# Patient Record
Sex: MALE | Race: WHITE | NOT HISPANIC OR LATINO | URBAN - METROPOLITAN AREA
[De-identification: names, ages, dates, MRNs, and addresses within clinical notes are randomized per-mention and may not be internally consistent; named-entity substitution may affect disease eponyms.]

---

## 2017-09-19 ENCOUNTER — IMPORTED ENCOUNTER (OUTPATIENT)
Dept: URBAN - METROPOLITAN AREA CLINIC 31 | Facility: CLINIC | Age: 37
End: 2017-09-19

## 2017-09-19 PROCEDURE — 92310 CONTACT LENS FITTING OU: CPT

## 2017-09-19 PROCEDURE — 92014 COMPRE OPH EXAM EST PT 1/>: CPT

## 2017-09-19 PROCEDURE — V2521 CNTCT LENS HYDROPHILIC TORIC: HCPCS

## 2017-09-19 PROCEDURE — 92015 DETERMINE REFRACTIVE STATE: CPT

## 2017-09-19 NOTE — PATIENT DISCUSSION
Refractive error Annual Good ocular health documented. Discussed options of glasses contacts or refractive surgery. Discussed importance of annual eye exams. Continue present contact lens modality. Stop/wear and call if any redness pain or decrease in vision occur. Glasses change optional. Continue present contact lens modality. Stop/wear and call if any redness pain or decrease in vision occur.

## 2017-09-21 NOTE — PROCEDURE NOTE: CLINICAL
PROCEDURE NOTE: Eylea #2 OS. Diagnosis: Neovascular AMD with Active CNV. Prior to injection, risks/benefits/alternatives discussed including corneal abrasion, infection, loss of vision, hemorrhage, cataract, glaucoma, retinal tears or detachment. A written consent is on file, and the need for today’s injection was discussed and the patient is understanding and wishes to proceed. The entire vial of Eylea was drawn up into a syringe. The opened vial, remaining drug, and filtered needle were disposed of in a certified biohazard container. Betadine prep was performed. Topical anesthesia was induced with Alcaine. 4% lidocaine pledge. A lid speculum was used. A short 30g needle on a 1cc syringe was used with product that that had previously been prepared under sterile conditions. Injection site: 3-4 mm from the limbus. The used syringe/needle was transferred to a biohazard container. Lid speculum removed. Mask worn during procedure. Patient tolerated procedure well. Count fingers vision was verified. There were no complications. Patient was given the standard instruction sheet. Patient given office phone number/answering service number and advised to call immediately should there be loss of vision or pain, or should they have any other questions or concerns. Jama Correia

## 2017-09-21 NOTE — PATIENT DISCUSSION
Eylea #2 (treatment only) injection recommended today after discussion of benefits, risks, alternatives, and off-label use. The injection was administered without complication. Post-injection instructions were reviewed and understood by the patient.

## 2017-10-11 NOTE — PROCEDURE NOTE: CLINICAL
PROCEDURE NOTE: Eylea #5 OD. Diagnosis: Neovascular AMD with Active CNV. [facility]  Lot: -[id number] Exp: 8/1/18  Prior to injection, risks/benefits/alternatives discussed including corneal abrasion, infection, loss of vision, hemorrhage, cataract, glaucoma, retinal tears or detachment. A written consent is on file, and the need for today’s injection was discussed and the patient is understanding and wishes to proceed. The entire vial of Eylea was drawn up into a syringe. The opened vial, remaining drug, and filtered needle were disposed of in a certified biohazard container. Betadine prep was performed. Topical anesthesia was induced with Alcaine. 4% lidocaine pledge. A lid speculum was used. A short 30g needle on a 1cc syringe was used with product that that had previously been prepared under sterile conditions. Injection site: 3-4 mm from the limbus. The used syringe/needle was transferred to a biohazard container. Lid speculum removed. Mask worn during procedure. Patient tolerated procedure well. Count fingers vision was verified. There were no complications. Patient was given the standard instruction sheet. Patient given office phone number/answering service number and advised to call immediately should there be loss of vision or pain, or should they have any other questions or concerns. Enda Boss

## 2017-10-11 NOTE — PATIENT DISCUSSION
Hanglea #5 injection recommended today after discussion of benefits, risks, alternatives. The injection was administered without complication. Post-injection instructions were reviewed and understood by the patient.

## 2017-11-16 NOTE — PATIENT DISCUSSION
Pt edu stable. Due to high tendency to return rec Eylea # 3 today. Pt elects to proceed. Will treat every 6 weeks for now.

## 2017-11-16 NOTE — PATIENT DISCUSSION
Pt edu stable from last visit but still active. Recc continuing with treatments. Recc Hanglea # 6 in 2 weeks. Pt elects to proceed.

## 2017-11-16 NOTE — PATIENT DISCUSSION
Eylea # 3 injection recommended today after discussion of benefits, risks, alternatives. The injection was administered without complication. Post-injection instructions were reviewed and understood by the patient.

## 2017-11-16 NOTE — PROCEDURE NOTE: CLINICAL
PROCEDURE NOTE: Eylea #3 OS. Diagnosis: Neovascular AMD with Active CNV. Prep: Betadine Drops and Betadine Scrub. Prior to injection, risks/benefits/alternatives discussed including corneal abrasion, infection, loss of vision, hemorrhage, cataract, glaucoma, retinal tears or detachment. A written consent is on file, and the need for today’s injection was discussed and the patient is understanding and wishes to proceed. The entire vial of Eylea was drawn up into a syringe. The opened vial, remaining drug, and filtered needle were disposed of in a certified biohazard container. Betadine prep was performed. Topical anesthesia was induced with Alcaine. 4% lidocaine pledge. A lid speculum was used. A short 30g needle on a 1cc syringe was used with product that that had previously been prepared under sterile conditions. Injection site: 3-4 mm from the limbus. The used syringe/needle was transferred to a biohazard container. Lid speculum removed. Mask worn during procedure. Patient tolerated procedure well. Count fingers vision was verified. There were no complications. Patient was given the standard instruction sheet. Patient given office phone number/answering service number and advised to call immediately should there be loss of vision or pain, or should they have any other questions or concerns. Edna Boss

## 2017-11-30 NOTE — PATIENT DISCUSSION
Pt edu stable from last visit but still active. Recc continuing with treatments. Recc Eylea # 6. Pt elects to proceed.

## 2017-11-30 NOTE — PROCEDURE NOTE: CLINICAL
PROCEDURE NOTE: Eylea #6 OD. Diagnosis: Neovascular AMD with Active CNV. Prior to injection, risks/benefits/alternatives discussed including corneal abrasion, infection, loss of vision, hemorrhage, cataract, glaucoma, retinal tears or detachment. A written consent is on file, and the need for today’s injection was discussed and the patient is understanding and wishes to proceed. The entire vial of Eylea was drawn up into a syringe. The opened vial, remaining drug, and filtered needle were disposed of in a certified biohazard container. Betadine prep was performed. Topical anesthesia was induced with Alcaine. 4% lidocaine pledge. A lid speculum was used. A short 30g needle on a 1cc syringe was used with product that that had previously been prepared under sterile conditions. Injection site: 3-4 mm from the limbus. The used syringe/needle was transferred to a biohazard container. Lid speculum removed. Mask worn during procedure. Patient tolerated procedure well. Count fingers vision was verified. There were no complications. Patient was given the standard instruction sheet. Patient given office phone number/answering service number and advised to call immediately should there be loss of vision or pain, or should they have any other questions or concerns. Galen Tyson

## 2017-11-30 NOTE — PATIENT DISCUSSION
Kathya #6 injection recommended today after discussion of benefits, risks, alternatives, and off-label use. The injection was administered without complication. Post-injection instructions were reviewed and understood by the patient.

## 2017-11-30 NOTE — PATIENT DISCUSSION
Pt edu stable. Due to high tendency to return [facility] Juanita # 4 as naga. Pt elects to proceed. Will treat every 6 weeks for now.

## 2017-12-14 NOTE — PATIENT DISCUSSION
Pt edu stable from last visit but still active. Recc continuing with treatments. Pt will return in 2-3 weeks for tx only.

## 2017-12-14 NOTE — PATIENT DISCUSSION
Kathya #4 injection recommended today after discussion of benefits, risks, alternatives. The injection was administered without complication. Post-injection instructions were reviewed and understood by the patient.

## 2017-12-14 NOTE — PROCEDURE NOTE: CLINICAL
PROCEDURE NOTE: Eylea #4 OS. Diagnosis: Neovascular AMD with Active CNV. Prep: Betadine Drops and Betadine Scrub. Prior to injection, risks/benefits/alternatives discussed including corneal abrasion, infection, loss of vision, hemorrhage, cataract, glaucoma, retinal tears or detachment. A written consent is on file, and the need for today’s injection was discussed and the patient is understanding and wishes to proceed. The entire vial of Eylea was drawn up into a syringe. The opened vial, remaining drug, and filtered needle were disposed of in a certified biohazard container. Betadine prep was performed. Topical anesthesia was induced with Alcaine. 4% lidocaine pledge. A lid speculum was used. A short 30g needle on a 1cc syringe was used with product that that had previously been prepared under sterile conditions. Injection site: 3-4 mm from the limbus. The used syringe/needle was transferred to a biohazard container. Lid speculum removed. Mask worn during procedure. Patient tolerated procedure well. Count fingers vision was verified. There were no complications. Patient was given the standard instruction sheet. Patient given office phone number/answering service number and advised to call immediately should there be loss of vision or pain, or should they have any other questions or concerns. [facility]

## 2018-01-03 NOTE — PROCEDURE NOTE: CLINICAL
PROCEDURE NOTE: Eylea #7 OD. Diagnosis: Neovascular AMD with Active CNV. Prior to injection, risks/benefits/alternatives discussed including corneal abrasion, infection, loss of vision, hemorrhage, cataract, glaucoma, retinal tears or detachment. A written consent is on file, and the need for today’s injection was discussed and the patient is understanding and wishes to proceed. The entire vial of Eylea was drawn up into a syringe. The opened vial, remaining drug, and filtered needle were disposed of in a certified biohazard container. Betadine prep was performed. Topical anesthesia was induced with Alcaine. 4% lidocaine pledge. A lid speculum was used. A short 30g needle on a 1cc syringe was used with product that that had previously been prepared under sterile conditions. Injection site: 3-4 mm from the limbus. The used syringe/needle was transferred to a biohazard container. Lid speculum removed. Mask worn during procedure. Patient tolerated procedure well. Count fingers vision was verified. There were no complications. Patient was given the standard instruction sheet. Patient given office phone number/answering service number and advised to call immediately should there be loss of vision or pain, or should they have any other questions or concerns. Parul Hardwick

## 2018-01-03 NOTE — PATIENT DISCUSSION
Kathya # 7  injection recommended today after discussion of benefits, risks, alternatives. The injection was administered without complication. Post-injection instructions were reviewed and understood by the patient.

## 2018-01-30 NOTE — PROCEDURE NOTE: CLINICAL
PROCEDURE NOTE: Eylea #8 OS. Diagnosis: Neovascular AMD with Active CNV. Prior to injection, risks/benefits/alternatives discussed including corneal abrasion, infection, loss of vision, hemorrhage, cataract, glaucoma, retinal tears or detachment. A written consent is on file, and the need for today’s injection was discussed and the patient is understanding and wishes to proceed. The entire vial of Eylea was drawn up into a syringe. The opened vial, remaining drug, and filtered needle were disposed of in a certified biohazard container. Betadine prep was performed. Topical anesthesia was induced with Alcaine. 4% lidocaine pledge. A lid speculum was used. A short 30g needle on a 1cc syringe was used with product that that had previously been prepared under sterile conditions. Injection site: 3-4 mm from the limbus. The used syringe/needle was transferred to a biohazard container. Lid speculum removed. Mask worn during procedure. Patient tolerated procedure well. Count fingers vision was verified. There were no complications. Patient was given the standard instruction sheet. Patient given office phone number/answering service number and advised to call immediately should there be loss of vision or pain, or should they have any other questions or concerns. Aly Buchanan

## 2018-02-08 NOTE — PATIENT DISCUSSION
Juanita #8 injection recommended today after discussion of benefits, risks, alternatives. The patient elects to proceed. The injection was administered without complication. Post-injection instructions were reviewed and understood by the patient. TREATMENT ONLY TODAY.

## 2018-02-08 NOTE — PROCEDURE NOTE: CLINICAL
PROCEDURE NOTE: Eylea #8 OD. Diagnosis: Neovascular AMD with Active CNV. Prep: Betadine Drops and Betadine Scrub. Prior to injection, risks/benefits/alternatives discussed including corneal abrasion, infection, loss of vision, hemorrhage, cataract, glaucoma, retinal tears or detachment. A written consent is on file, and the need for today’s injection was discussed and the patient is understanding and wishes to proceed. The entire vial of Eylea was drawn up into a syringe. The opened vial, remaining drug, and filtered needle were disposed of in a certified biohazard container. Betadine prep was performed. Topical anesthesia was induced with Alcaine. 4% lidocaine pledge. A lid speculum was used. A short 30g needle on a 1cc syringe was used with product that that had previously been prepared under sterile conditions. Injection site: 3-4 mm from the limbus. The used syringe/needle was transferred to a biohazard container. Lid speculum removed. Mask worn during procedure. Patient tolerated procedure well. Count fingers vision was verified. There were no complications. Patient was given the standard instruction sheet. Patient given office phone number/answering service number and advised to call immediately should there be loss of vision or pain, or should they have any other questions or concerns. Savanah Bowens

## 2018-03-13 NOTE — PATIENT DISCUSSION
Kathya #9 injection recommended today after discussion of benefits, risks, alternatives. The patient elects to proceed. The injection was administered without complication. Post-injection instructions were reviewed and understood by the patient. TREATMENT ONLY TODAY.

## 2018-03-13 NOTE — PROCEDURE NOTE: CLINICAL
PROCEDURE NOTE: Eylea #9 OD. Diagnosis: Neovascular AMD with Active CNV. Prep: Betadine Drops and Betadine Scrub. Prior to injection, risks/benefits/alternatives discussed including corneal abrasion, infection, loss of vision, hemorrhage, cataract, glaucoma, retinal tears or detachment. A written consent is on file, and the need for today’s injection was discussed and the patient is understanding and wishes to proceed. The entire vial of Eylea was drawn up into a syringe. The opened vial, remaining drug, and filtered needle were disposed of in a certified biohazard container. Betadine prep was performed. Topical anesthesia was induced with Alcaine. 4% lidocaine pledge. A lid speculum was used. A short 30g needle on a 1cc syringe was used with product that that had previously been prepared under sterile conditions. Injection site: 3-4 mm from the limbus. The used syringe/needle was transferred to a biohazard container. Lid speculum removed. Mask worn during procedure. Patient tolerated procedure well. Count fingers vision was verified. There were no complications. Patient was given the standard instruction sheet. Patient given office phone number/answering service number and advised to call immediately should there be loss of vision or pain, or should they have any other questions or concerns. Carlotta Josue

## 2018-03-27 NOTE — PATIENT DISCUSSION
Kathya #6 injection recommended today after discussion of benefits, risks, alternatives. The injection was administered without complication. Post-injection instructions were reviewed and understood by the patient.

## 2018-03-27 NOTE — PROCEDURE NOTE: CLINICAL
PROCEDURE NOTE: Eylea #6 OS. Diagnosis: Neovascular AMD with Active CNV. Prior to injection, risks/benefits/alternatives discussed including corneal abrasion, infection, loss of vision, hemorrhage, cataract, glaucoma, retinal tears or detachment. A written consent is on file, and the need for today’s injection was discussed and the patient is understanding and wishes to proceed. The entire vial of Eylea was drawn up into a syringe. The opened vial, remaining drug, and filtered needle were disposed of in a certified biohazard container. Betadine prep was performed. Topical anesthesia was induced with Alcaine. 4% lidocaine pledge. A lid speculum was used. A short 30g needle on a 1cc syringe was used with product that that had previously been prepared under sterile conditions. Injection site: 3-4 mm from the limbus. The used syringe/needle was transferred to a biohazard container. Lid speculum removed. Mask worn during procedure. Patient tolerated procedure well. Count fingers vision was verified. There were no complications. Patient was given the standard instruction sheet. Patient given office phone number/answering service number and advised to call immediately should there be loss of vision or pain, or should they have any other questions or concerns. Belle Torrez

## 2018-04-24 NOTE — PROCEDURE NOTE: CLINICAL
PROCEDURE NOTE: Eylea #10 OD. Diagnosis: Neovascular AMD with Active CNV. Prior to injection, risks/benefits/alternatives discussed including corneal abrasion, infection, loss of vision, hemorrhage, cataract, glaucoma, retinal tears or detachment. A written consent is on file, and the need for today’s injection was discussed and the patient is understanding and wishes to proceed. The entire vial of Eylea was drawn up into a syringe. The opened vial, remaining drug, and filtered needle were disposed of in a certified biohazard container. Betadine prep was performed. Topical anesthesia was induced with Alcaine. 4% lidocaine pledge. A lid speculum was used. A short 30g needle on a 1cc syringe was used with product that that had previously been prepared under sterile conditions. Injection site: 3-4 mm from the limbus. The used syringe/needle was transferred to a biohazard container. Lid speculum removed. Mask worn during procedure. Patient tolerated procedure well. Count fingers vision was verified. There were no complications. Patient was given the standard instruction sheet. Patient given office phone number/answering service number and advised to call immediately should there be loss of vision or pain, or should they have any other questions or concerns. Crystal Lin

## 2018-04-24 NOTE — PATIENT DISCUSSION
Kathya #10 injection recommended today after discussion of benefits, risks, alternatives. The patient elects to proceed. The injection was administered without complication. Post-injection instructions were reviewed and understood by the patient.

## 2018-05-16 NOTE — PATIENT DISCUSSION
Juanita #7 today as previously discussed.  Injection given and tolerated well by patient.  S/S of endophthalmitis discussed with patient, patient to call with any changes or S/S of endophthalmitis. Follow up on 5/22/18 as scheduled.

## 2018-05-22 NOTE — PATIENT DISCUSSION
Juanita #11 injection recommended today after discussion of benefits, risks, alternatives. The patient elects to proceed. The injection was administered without complication. Post-injection instructions were reviewed and understood by the patient.

## 2018-05-22 NOTE — PROCEDURE NOTE: CLINICAL
PROCEDURE NOTE: Eylea #11 OD. Diagnosis: Neovascular AMD with Active CNV. Prior to injection, risks/benefits/alternatives discussed including corneal abrasion, infection, loss of vision, hemorrhage, cataract, glaucoma, retinal tears or detachment. A written consent is on file, and the need for today’s injection was discussed and the patient is understanding and wishes to proceed. The entire vial of Eylea was drawn up into a syringe. The opened vial, remaining drug, and filtered needle were disposed of in a certified biohazard container. Betadine prep was performed. Topical anesthesia was induced with Alcaine. 4% lidocaine pledge. A lid speculum was used. A short 30g needle on a 1cc syringe was used with product that that had previously been prepared under sterile conditions. Injection site: 3-4 mm from the limbus. The used syringe/needle was transferred to a biohazard container. Lid speculum removed. Mask worn during procedure. Patient tolerated procedure well. Count fingers vision was verified. There were no complications. Patient was given the standard instruction sheet. Patient given office phone number/answering service number and advised to call immediately should there be loss of vision or pain, or should they have any other questions or concerns. Patti Natarajan

## 2018-06-19 NOTE — PATIENT DISCUSSION
Juanita #12 injection recommended today after discussion of benefits, risks, alternatives. The patient elects to proceed. The injection was administered without complication. Post-injection instructions were reviewed and understood by the patient.

## 2018-06-19 NOTE — PATIENT DISCUSSION
Increased subretinal fluid seen on today's examination and diagnostic testing. Recommended continuing 4-5 week interval injections.

## 2018-06-19 NOTE — PATIENT DISCUSSION
06/19/2018 (RETINA)- OCT only at the next visit. POSSIBLE Eylea injection at that time. Recommended continuing injection intervals at 4-5 weeks OD and 8-9 weeks OS.

## 2018-06-19 NOTE — PROCEDURE NOTE: CLINICAL
PROCEDURE NOTE: Eylea #12 OD. Diagnosis: Neovascular AMD with Active CNV. Prep: Betadine Drops and Betadine Scrub. Prior to injection, risks/benefits/alternatives discussed including corneal abrasion, infection, loss of vision, hemorrhage, cataract, glaucoma, retinal tears or detachment. A written consent is on file, and the need for today’s injection was discussed and the patient is understanding and wishes to proceed. The entire vial of Eylea was drawn up into a syringe. The opened vial, remaining drug, and filtered needle were disposed of in a certified biohazard container. Betadine prep was performed. Topical anesthesia was induced with Alcaine. 4% lidocaine pledge. A lid speculum was used. A short 30g needle on a 1cc syringe was used with product that that had previously been prepared under sterile conditions. Injection site: 3-4 mm from the limbus. The used syringe/needle was transferred to a biohazard container. Lid speculum removed. Mask worn during procedure. Patient tolerated procedure well. Count fingers vision was verified. There were no complications. Patient was given the standard instruction sheet. Patient given office phone number/answering service number and advised to call immediately should there be loss of vision or pain, or should they have any other questions or concerns. [facility]

## 2018-07-25 NOTE — PROCEDURE NOTE: CLINICAL
PROCEDURE NOTE: Eylea #13 OD. Diagnosis: Neovascular AMD with Active CNV. Prep: Betadine Drops and Betadine Scrub. Prior to injection, risks/benefits/alternatives discussed including corneal abrasion, infection, loss of vision, hemorrhage, cataract, glaucoma, retinal tears or detachment. A written consent is on file, and the need for today’s injection was discussed and the patient is understanding and wishes to proceed. The entire vial of Eylea was drawn up into a syringe. The opened vial, remaining drug, and filtered needle were disposed of in a certified biohazard container. Betadine prep was performed. Topical anesthesia was induced with Alcaine. 4% lidocaine pledge. A lid speculum was used. A short 30g needle on a 1cc syringe was used with product that that had previously been prepared under sterile conditions. Injection site: 3-4 mm from the limbus. The used syringe/needle was transferred to a biohazard container. Lid speculum removed. Mask worn during procedure. Patient tolerated procedure well. Count fingers vision was verified. There were no complications. Patient was given the standard instruction sheet. Patient given office phone number/answering service number and advised to call immediately should there be loss of vision or pain, or should they have any other questions or concerns. Josette Land

## 2018-07-25 NOTE — PATIENT DISCUSSION
Eylea injection #13 recommended today after discussion of benefits, risks, and alternatives. he injection was administered without complication. Post-injection instructions were reviewed and understood by the patient.

## 2018-08-08 NOTE — PROCEDURE NOTE: CLINICAL
PROCEDURE NOTE: Eylea #8 OS. Diagnosis: Neovascular AMD with Active CNV. Prep: Betadine Drops and Betadine Scrub. Prior to injection, risks/benefits/alternatives discussed including corneal abrasion, infection, loss of vision, hemorrhage, cataract, glaucoma, retinal tears or detachment. A written consent is on file, and the need for today’s injection was discussed and the patient is understanding and wishes to proceed. The entire vial of Eylea was drawn up into a syringe. The opened vial, remaining drug, and filtered needle were disposed of in a certified biohazard container. Betadine prep was performed. Topical anesthesia was induced with Alcaine. 4% lidocaine pledge. A lid speculum was used. A short 30g needle on a 1cc syringe was used with product that that had previously been prepared under sterile conditions. Injection site: 3-4 mm from the limbus. The used syringe/needle was transferred to a biohazard container. Lid speculum removed. Mask worn during procedure. Patient tolerated procedure well. Count fingers vision was verified. There were no complications. Patient was given the standard instruction sheet. Patient given office phone number/answering service number and advised to call immediately should there be loss of vision or pain, or should they have any other questions or concerns. Jackie Bernabe

## 2018-09-05 NOTE — PATIENT DISCUSSION
Based on today's examination there was an increase in the SRF, we recommend Eylea injection.  Follow up in 4 weeks for repeat injection.

## 2018-09-05 NOTE — PATIENT DISCUSSION
Eylea injection  recommended today after discussion of benefits, risks, and alternatives. he injection was administered without complication. Post-injection instructions were reviewed and understood by the patient.

## 2018-09-05 NOTE — PROCEDURE NOTE: CLINICAL
PROCEDURE NOTE: Eylea OD. Diagnosis: Neovascular AMD with Active CNV. Prior to injection, risks/benefits/alternatives discussed including corneal abrasion, infection, loss of vision, hemorrhage, cataract, glaucoma, retinal tears or detachment. A written consent is on file, and the need for today’s injection was discussed and the patient is understanding and wishes to proceed. The entire vial of Eylea was drawn up into a syringe. The opened vial, remaining drug, and filtered needle were disposed of in a certified biohazard container. Betadine prep was performed. Topical anesthesia was induced with Alcaine. 4% lidocaine pledge. A lid speculum was used. A short 30g needle on a 1cc syringe was used with product that that had previously been prepared under sterile conditions. Injection site: 3-4 mm from the limbus. The used syringe/needle was transferred to a biohazard container. Lid speculum removed. Mask worn during procedure. Patient tolerated procedure well. Count fingers vision was verified. There were no complications. Patient was given the standard instruction sheet. Patient given office phone number/answering service number and advised to call immediately should there be loss of vision or pain, or should they have any other questions or concerns. David Burr

## 2018-09-05 NOTE — PATIENT DISCUSSION
No new srf/irf, stable, recommend continuing treatment q 3 months, goal is to maintain current level of vision.  Follow up in 1 month for  injection.

## 2018-10-09 NOTE — PATIENT DISCUSSION
Based on today's examination there was a decrease in the SRF, we recommend Eylea injection.  Follow up in 4 weeks for repeat injection.

## 2018-10-09 NOTE — PROCEDURE NOTE: CLINICAL
PROCEDURE NOTE: Eylea 2mg OD. Diagnosis: Neovascular AMD with Active CNV. Prior to injection, risks/benefits/alternatives discussed including corneal abrasion, infection, loss of vision, hemorrhage, cataract, glaucoma, retinal tears or detachment. A written consent is on file, and the need for today’s injection was discussed and the patient is understanding and wishes to proceed. The entire vial of Eylea was drawn up into a syringe. The opened vial, remaining drug, and filtered needle were disposed of in a certified biohazard container. Betadine prep was performed. Topical anesthesia was induced with Alcaine. 4% lidocaine pledge. A lid speculum was used. A short 30g needle on a 1cc syringe was used with product that that had previously been prepared under sterile conditions. Injection site: 3-4 mm from the limbus. The used syringe/needle was transferred to a biohazard container. Lid speculum removed. Mask worn during procedure. Patient tolerated procedure well. Count fingers vision was verified. There were no complications. Patient was given the standard instruction sheet. Patient given office phone number/answering service number and advised to call immediately should there be loss of vision or pain, or should they have any other questions or concerns. Martir Hull

## 2018-10-23 NOTE — PATIENT DISCUSSION
Patient is here for treatment only on the OS. We will continue the injection intervals every q4 weeks on the OD. Patient already has appt set for next examination.

## 2018-10-23 NOTE — PROCEDURE NOTE: CLINICAL
PROCEDURE NOTE: Eylea 2mg OS. Diagnosis: Neovascular AMD with Active CNV. Prior to injection, risks/benefits/alternatives discussed including corneal abrasion, infection, loss of vision, hemorrhage, cataract, glaucoma, retinal tears or detachment. A written consent is on file, and the need for today’s injection was discussed and the patient is understanding and wishes to proceed. The entire vial of Eylea was drawn up into a syringe. The opened vial, remaining drug, and filtered needle were disposed of in a certified biohazard container. Betadine prep was performed. Topical anesthesia was induced with Alcaine. 4% lidocaine pledge. A lid speculum was used. A short 30g needle on a 1cc syringe was used with product that that had previously been prepared under sterile conditions. Injection site: 3-4 mm from the limbus. The used syringe/needle was transferred to a biohazard container. Lid speculum removed. Mask worn during procedure. Patient tolerated procedure well. Count fingers vision was verified. There were no complications. Patient was given the standard instruction sheet. Patient given office phone number/answering service number and advised to call immediately should there be loss of vision or pain, or should they have any other questions or concerns. Latasha Oviedo

## 2018-11-06 NOTE — PROCEDURE NOTE: CLINICAL
PROCEDURE NOTE: Eylea 2mg OD. Diagnosis: Neovascular AMD with Active CNV. Prior to injection, risks/benefits/alternatives discussed including corneal abrasion, infection, loss of vision, hemorrhage, cataract, glaucoma, retinal tears or detachment. A written consent is on file, and the need for today’s injection was discussed and the patient is understanding and wishes to proceed. The entire vial of Eylea was drawn up into a syringe. The opened vial, remaining drug, and filtered needle were disposed of in a certified biohazard container. Betadine prep was performed. Topical anesthesia was induced with Alcaine. 4% lidocaine pledge. A lid speculum was used. A short 30g needle on a 1cc syringe was used with product that that had previously been prepared under sterile conditions. Injection site: 3-4 mm from the limbus. The used syringe/needle was transferred to a biohazard container. Lid speculum removed. Mask worn during procedure. Patient tolerated procedure well. Count fingers vision was verified. There were no complications. Patient was given the standard instruction sheet. Patient given office phone number/answering service number and advised to call immediately should there be loss of vision or pain, or should they have any other questions or concerns. Yee Renee

## 2018-11-06 NOTE — PATIENT DISCUSSION
Decision to treat was made based on today's clinical findings. Trace SRF, improved. Recommend continuing with injection today.  Follow up in 4-5 weeks for poss repeat injection.

## 2018-11-19 ENCOUNTER — IMPORTED ENCOUNTER (OUTPATIENT)
Dept: URBAN - METROPOLITAN AREA CLINIC 31 | Facility: CLINIC | Age: 38
End: 2018-11-19

## 2018-11-19 PROCEDURE — V2521 CNTCT LENS HYDROPHILIC TORIC: HCPCS

## 2018-11-19 PROCEDURE — 92310 CONTACT LENS FITTING OU: CPT

## 2018-11-19 PROCEDURE — 92015 DETERMINE REFRACTIVE STATE: CPT

## 2018-11-19 PROCEDURE — 92014 COMPRE OPH EXAM EST PT 1/>: CPT

## 2018-12-13 NOTE — PATIENT DISCUSSION
Decision to treat was made based on today's clinical findings. Increased SRF, worse. Recommend continuing with injection today.  Follow up in 4-5 weeks for poss repeat injection.

## 2018-12-13 NOTE — PROCEDURE NOTE: CLINICAL
PROCEDURE NOTE: Eylea 2mg OD. Diagnosis: Neovascular AMD with Active CNV. Prior to injection, risks/benefits/alternatives discussed including corneal abrasion, infection, loss of vision, hemorrhage, cataract, glaucoma, retinal tears or detachment. A written consent is on file, and the need for today’s injection was discussed and the patient is understanding and wishes to proceed. The entire vial of Eylea was drawn up into a syringe. The opened vial, remaining drug, and filtered needle were disposed of in a certified biohazard container. Betadine prep was performed. Topical anesthesia was induced with Alcaine. 4% lidocaine pledge. A lid speculum was used. A short 30g needle on a 1cc syringe was used with product that that had previously been prepared under sterile conditions. Injection site: 3-4 mm from the limbus. The used syringe/needle was transferred to a biohazard container. Lid speculum removed. Mask worn during procedure. Patient tolerated procedure well. Count fingers vision was verified. There were no complications. Patient was given the standard instruction sheet. Patient given office phone number/answering service number and advised to call immediately should there be loss of vision or pain, or should they have any other questions or concerns. Infante Eric

## 2019-01-03 NOTE — PATIENT DISCUSSION
Advised to call immediately if any worsening distortion or blurring is noted. Patient is asking that you add iron and vitamin d to the lab orders to be done prior to her 1/14/19 appt.

## 2019-01-15 NOTE — PROCEDURE NOTE: CLINICAL
PROCEDURE NOTE: Eylea 2mg OD. Diagnosis: Neovascular AMD with Active CNV. Prior to injection, risks/benefits/alternatives discussed including corneal abrasion, infection, loss of vision, hemorrhage, cataract, glaucoma, retinal tears or detachment. A written consent is on file, and the need for today’s injection was discussed and the patient is understanding and wishes to proceed. The entire vial of Eylea was drawn up into a syringe. The opened vial, remaining drug, and filtered needle were disposed of in a certified biohazard container. Betadine prep was performed. Topical anesthesia was induced with Alcaine. 4% lidocaine pledge. A lid speculum was used. A short 30g needle on a 1cc syringe was used with product that that had previously been prepared under sterile conditions. Injection site: 3-4 mm from the limbus. The used syringe/needle was transferred to a biohazard container. Lid speculum removed. Mask worn during procedure. Patient tolerated procedure well. Count fingers vision was verified. There were no complications. Patient was given the standard instruction sheet. Patient given office phone number/answering service number and advised to call immediately should there be loss of vision or pain, or should they have any other questions or concerns. Taz Mcclendon

## 2019-01-15 NOTE — PATIENT DISCUSSION
Decreased SRF seen on today's exam. Decision to treat was made based on today's clinical findings.  Follow up in 4-5 weeks for poss repeat injection.

## 2019-01-23 NOTE — PATIENT DISCUSSION
Patient here for treatment only as discussed at last visit.  Stable, no new SRF/IRF.  Recommend continuing with treatment q 3 months.  Goal is to maintain current level of vision.

## 2019-01-23 NOTE — PATIENT DISCUSSION
James Gumzan is a 13 month old female who was brought in for her  Well Child visit.     History was provided by caregiver  HPI:   Patient presents for:  Well Child    Concerns  none    Problem List  Patient Active Problem List:     Well child check     Enc See Wet AMD for IMP/PLN. bilaterally, no abnormal eye discharge is noted, conjunctiva are clear, extraocular motion is intact bilaterally; normal cover test, symmetric light reflex  Ears/Hearing:  tympanic membranes are normal bilaterally, hearing is grossly intact  Nose/Mouth/Thr reviewed with parent(s). Parental concerns and questions addressed. Feeding, development and activity discussed  Anticipatory guidance for age reviewed.   [facility] Developmental Handout provided    Follow up in 3 months    01/21/2017  Angella Borja MD

## 2019-01-23 NOTE — PROCEDURE NOTE: CLINICAL
PROCEDURE NOTE: Eylea 2mg OS. Diagnosis: Neovascular AMD with Active CNV. Prior to injection, risks/benefits/alternatives discussed including corneal abrasion, infection, loss of vision, hemorrhage, cataract, glaucoma, retinal tears or detachment. A written consent is on file, and the need for today’s injection was discussed and the patient is understanding and wishes to proceed. The entire vial of Eylea was drawn up into a syringe. The opened vial, remaining drug, and filtered needle were disposed of in a certified biohazard container. Betadine prep was performed. Topical anesthesia was induced with Alcaine. 4% lidocaine pledge. A lid speculum was used. A short 30g needle on a 1cc syringe was used with product that that had previously been prepared under sterile conditions. Injection site: 3-4 mm from the limbus. The used syringe/needle was transferred to a biohazard container. Lid speculum removed. Mask worn during procedure. Patient tolerated procedure well. Count fingers vision was verified. There were no complications. Patient was given the standard instruction sheet. Patient given office phone number/answering service number and advised to call immediately should there be loss of vision or pain, or should they have any other questions or concerns. Lizett Disla

## 2019-02-02 NOTE — PROCEDURE NOTE: CLINICAL
PROCEDURE NOTE: Eylea #7 OS. Diagnosis: Neovascular AMD with Active CNV. Prior to injection, risks/benefits/alternatives discussed including corneal abrasion, infection, loss of vision, hemorrhage, cataract, glaucoma, retinal tears or detachment. A written consent is on file, and the need for today’s injection was discussed and the patient is understanding and wishes to proceed. The entire vial of Eylea was drawn up into a syringe. The opened vial, remaining drug, and filtered needle were disposed of in a certified biohazard container. Betadine prep was performed. Topical anesthesia was induced with Alcaine. 4% lidocaine pledge. A lid speculum was used. A short 30g needle on a 1cc syringe was used with product that that had previously been prepared under sterile conditions. Injection site: 3-4 mm from the limbus. The used syringe/needle was transferred to a biohazard container. Lid speculum removed. Mask worn during procedure. Patient tolerated procedure well. Count fingers vision was verified. There were no complications. Patient was given the standard instruction sheet. Patient given office phone number/answering service number and advised to call immediately should there be loss of vision or pain, or should they have any other questions or concerns. Aly Buchanan
Patent

## 2019-02-12 NOTE — PATIENT DISCUSSION
Decision to treat was made based on today's clinical findings.  Decreased SRF, improved.  Recommend continuing with treatment today, return in 4-5 weeks for possible repeat injection at that time.

## 2019-02-12 NOTE — PROCEDURE NOTE: CLINICAL
PROCEDURE NOTE: Eylea 2mg OD. Diagnosis: Neovascular AMD with Active CNV. Prior to injection, risks/benefits/alternatives discussed including corneal abrasion, infection, loss of vision, hemorrhage, cataract, glaucoma, retinal tears or detachment. A written consent is on file, and the need for today’s injection was discussed and the patient is understanding and wishes to proceed. The entire vial of Eylea was drawn up into a syringe. The opened vial, remaining drug, and filtered needle were disposed of in a certified biohazard container. Betadine prep was performed. Topical anesthesia was induced with Alcaine. 4% lidocaine pledge. A lid speculum was used. A short 30g needle on a 1cc syringe was used with product that that had previously been prepared under sterile conditions. Injection site: 3-4 mm from the limbus. The used syringe/needle was transferred to a biohazard container. Lid speculum removed. Mask worn during procedure. Patient tolerated procedure well. Count fingers vision was verified. There were no complications. Patient was given the standard instruction sheet. Patient given office phone number/answering service number and advised to call immediately should there be loss of vision or pain, or should they have any other questions or concerns. Patti Natarajan

## 2019-03-12 NOTE — PROCEDURE NOTE: CLINICAL
PROCEDURE NOTE: Eylea 2mg OD. Diagnosis: Neovascular AMD with Active CNV. Prior to injection, risks/benefits/alternatives discussed including corneal abrasion, infection, loss of vision, hemorrhage, cataract, glaucoma, retinal tears or detachment. A written consent is on file, and the need for today’s injection was discussed and the patient is understanding and wishes to proceed. The entire vial of Eylea was drawn up into a syringe. The opened vial, remaining drug, and filtered needle were disposed of in a certified biohazard container. Betadine prep was performed. Topical anesthesia was induced with Alcaine. 4% lidocaine pledge. A lid speculum was used. A short 30g needle on a 1cc syringe was used with product that that had previously been prepared under sterile conditions. Injection site: 3-4 mm from the limbus. The used syringe/needle was transferred to a biohazard container. Lid speculum removed. Mask worn during procedure. Patient tolerated procedure well. Count fingers vision was verified. There were no complications. Patient was given the standard instruction sheet. Patient given office phone number/answering service number and advised to call immediately should there be loss of vision or pain, or should they have any other questions or concerns. Latasha Oviedo

## 2019-03-12 NOTE — PATIENT DISCUSSION
Stable, no new SRF/IRF.  Recommend continuing with treatment q 3 months.  Goal is to maintain current level of vision.

## 2019-03-12 NOTE — PATIENT DISCUSSION
Decision to treat was made based on today's clinical findings. No IRF/SRF, improved.  Recommend continuing with treatment today, return in 4-5 weeks for possible repeat injection at that time.

## 2019-04-09 NOTE — PATIENT DISCUSSION
Decision to treat was made based on today's clinical findings.  Recommend continuing with treatment today, return in 4-5 weeks for possible repeat injection at that time.

## 2019-04-09 NOTE — PROCEDURE NOTE: CLINICAL
PROCEDURE NOTE: Eylea 2mg OD. Diagnosis: Neovascular AMD with Active CNV. Prior to injection, risks/benefits/alternatives discussed including corneal abrasion, infection, loss of vision, hemorrhage, cataract, glaucoma, retinal tears or detachment. A written consent is on file, and the need for today’s injection was discussed and the patient is understanding and wishes to proceed. The entire vial of Eylea was drawn up into a syringe. The opened vial, remaining drug, and filtered needle were disposed of in a certified biohazard container. Betadine prep was performed. Topical anesthesia was induced with Alcaine. 4% lidocaine pledge. A lid speculum was used. A short 30g needle on a 1cc syringe was used with product that that had previously been prepared under sterile conditions. Injection site: 3-4 mm from the limbus. The used syringe/needle was transferred to a biohazard container. Lid speculum removed. Mask worn during procedure. Patient tolerated procedure well. Count fingers vision was verified. There were no complications. Patient was given the standard instruction sheet. Patient given office phone number/answering service number and advised to call immediately should there be loss of vision or pain, or should they have any other questions or concerns. Gustavo Arreguin

## 2019-04-09 NOTE — PATIENT DISCUSSION
No IRF/SRF present on today's exam. Recommend continuing with treatment q 3 months.   Goal is to maintain current level of vision.

## 2019-05-01 NOTE — PATIENT DISCUSSION
Increased PED, no new IRF/SRF present on today's exam/diagnostic testing. Recommend continuing with treatment q 3 months.   Goal is to maintain current level of vision.

## 2019-05-01 NOTE — PATIENT DISCUSSION
PED, no new SRF/IRF.  Will have patient return in 1-2 weeks for treatment only.  Recommend continuing treatment intervals q 4-5 weeks.  Goal of treatment is to maintain current level of vision.

## 2019-05-01 NOTE — PROCEDURE NOTE: CLINICAL
PROCEDURE NOTE: Eylea 2mg OS. Diagnosis: Neovascular AMD with Active CNV. Prior to injection, risks/benefits/alternatives discussed including corneal abrasion, infection, loss of vision, hemorrhage, cataract, glaucoma, retinal tears or detachment. A written consent is on file, and the need for today’s injection was discussed and the patient is understanding and wishes to proceed. The entire vial of Eylea was drawn up into a syringe. The opened vial, remaining drug, and filtered needle were disposed of in a certified biohazard container. Betadine prep was performed. Topical anesthesia was induced with Alcaine. 4% lidocaine pledge. A lid speculum was used. A short 30g needle on a 1cc syringe was used with product that that had previously been prepared under sterile conditions. Injection site: 3-4 mm from the limbus. The used syringe/needle was transferred to a biohazard container. Lid speculum removed. Mask worn during procedure. Patient tolerated procedure well. Count fingers vision was verified. There were no complications. Patient was given the standard instruction sheet. Patient given office phone number/answering service number and advised to call immediately should there be loss of vision or pain, or should they have any other questions or concerns. Torrie John

## 2019-05-15 NOTE — PROCEDURE NOTE: CLINICAL
PROCEDURE NOTE: Eylea 2mg OD. Diagnosis: Neovascular AMD with Active CNV. Prior to injection, risks/benefits/alternatives discussed including corneal abrasion, infection, loss of vision, hemorrhage, cataract, glaucoma, retinal tears or detachment. A written consent is on file, and the need for today’s injection was discussed and the patient is understanding and wishes to proceed. The entire vial of Eylea was drawn up into a syringe. The opened vial, remaining drug, and filtered needle were disposed of in a certified biohazard container. Betadine prep was performed. Topical anesthesia was induced with Alcaine. 4% lidocaine pledge. A lid speculum was used. A short 30g needle on a 1cc syringe was used with product that that had previously been prepared under sterile conditions. Injection site: 3-4 mm from the limbus. The used syringe/needle was transferred to a biohazard container. Lid speculum removed. Mask worn during procedure. Patient tolerated procedure well. Count fingers vision was verified. There were no complications. Patient was given the standard instruction sheet. Patient given office phone number/answering service number and advised to call immediately should there be loss of vision or pain, or should they have any other questions or concerns. Viral Torres

## 2019-06-12 NOTE — PROCEDURE NOTE: CLINICAL
PROCEDURE NOTE: Eylea 2mg OD. Diagnosis: Neovascular AMD with Active CNV. Prior to injection, risks/benefits/alternatives discussed including corneal abrasion, infection, loss of vision, hemorrhage, cataract, glaucoma, retinal tears or detachment. A written consent is on file, and the need for today’s injection was discussed and the patient is understanding and wishes to proceed. The entire vial of Eylea was drawn up into a syringe. The opened vial, remaining drug, and filtered needle were disposed of in a certified biohazard container. Betadine prep was performed. Topical anesthesia was induced with Alcaine. 4% lidocaine pledge. A lid speculum was used. A short 30g needle on a 1cc syringe was used with product that that had previously been prepared under sterile conditions. Injection site: 3-4 mm from the limbus. The used syringe/needle was transferred to a biohazard container. Lid speculum removed. Mask worn during procedure. Patient tolerated procedure well. Count fingers vision was verified. There were no complications. Patient was given the standard instruction sheet. Patient given office phone number/answering service number and advised to call immediately should there be loss of vision or pain, or should they have any other questions or concerns. Carlotta Josue

## 2019-06-12 NOTE — PATIENT DISCUSSION
Decision to treat was made based on today's clinical findings.  Increased SRF, PED, worse.  Continue with injection today, return in 4-5 weeks for possible repeat injection.

## 2019-06-12 NOTE — PATIENT DISCUSSION
Increased PED, no new IRF/SRF present on today's exam/diagnostic testing. Recommend continuing with treatment q 3 months.   Goal is to maintain current level of vision.  Monitor for now.

## 2019-08-07 NOTE — PATIENT DISCUSSION
Increased SRF/IRF, PED,worse,  present on today's exam/diagnostic testing. Will have Pt return in 1 week for Injection. Will treat every 4 weeks (alternating eyes).

## 2019-08-07 NOTE — PATIENT DISCUSSION
Decision to treat was made based on today's clinical findings.  Increased SRF/IRF, PED, worse.  Continue with injection today, return in 4 weeks for repeat injection (alternating eyes).

## 2019-08-09 NOTE — PROCEDURE NOTE: CLINICAL
PROCEDURE NOTE: Eylea 2mg Prior to injection, risks/benefits/alternatives discussed including corneal abrasion, infection, loss of vision, hemorrhage, cataract, glaucoma, retinal tears or detachment. A written consent is on file, and the need for today’s injection was discussed and the patient is understanding and wishes to proceed. The entire vial of Eylea was drawn up into a syringe. The opened vial, remaining drug, and filtered needle were disposed of in a certified biohazard container. Betadine prep was performed. Topical anesthesia was induced with Alcaine. 4% lidocaine pledge. A lid speculum was used. A short 30g needle on a 1cc syringe was used with product that that had previously been prepared under sterile conditions. Injection site: 3-4 mm from the limbus. The used syringe/needle was transferred to a biohazard container. Lid speculum removed. Mask worn during procedure. Patient tolerated procedure well. Count fingers vision was verified. There were no complications. Patient was given the standard instruction sheet. Patient given office phone number/answering service number and advised to call immediately should there be loss of vision or pain, or should they have any other questions or concerns. Josette Land

## 2019-08-09 NOTE — PATIENT DISCUSSION
Increased SRF/IRF, PED,worse,  present on today's exam/diagnostic testing. Will treat every 4 weeks (alternating eyes).

## 2019-09-06 NOTE — PATIENT DISCUSSION
Decreased PED/SRF Improved seen on today's exam, Continue with injection today, return in 4 weeks for repeat injection (alternating eyes).

## 2019-09-06 NOTE — PATIENT DISCUSSION
Decreased PED/SRF, improved seen on today's exam, continue intervals every 4 weeks alternating eyes.

## 2019-10-18 NOTE — PROCEDURE NOTE: CLINICAL
PROCEDURE NOTE: Eylea 2mg OS. Diagnosis: Neovascular AMD with Active CNV. Prior to injection, risks/benefits/alternatives discussed including corneal abrasion, infection, loss of vision, hemorrhage, cataract, glaucoma, retinal tears or detachment. A written consent is on file, and the need for today’s injection was discussed and the patient is understanding and wishes to proceed. The entire vial of Eylea was drawn up into a syringe. The opened vial, remaining drug, and filtered needle were disposed of in a certified biohazard container. Betadine prep was performed. Topical anesthesia was induced with Alcaine. 4% lidocaine pledge. A lid speculum was used. A short 30g needle on a 1cc syringe was used with product that that had previously been prepared under sterile conditions. Injection site: 3-4 mm from the limbus. The used syringe/needle was transferred to a biohazard container. Lid speculum removed. Mask worn during procedure. Patient tolerated procedure well. Count fingers vision was verified. There were no complications. Patient was given the standard instruction sheet. Patient given office phone number/answering service number and advised to call immediately should there be loss of vision or pain, or should they have any other questions or concerns. Yee Renee

## 2019-12-11 NOTE — PROCEDURE NOTE: CLINICAL
PROCEDURE NOTE: Eylea 2mg OS. Diagnosis: Neovascular AMD with Active CNV. Prior to injection, risks/benefits/alternatives discussed including corneal abrasion, infection, loss of vision, hemorrhage, cataract, glaucoma, retinal tears or detachment. A written consent is on file, and the need for today’s injection was discussed and the patient is understanding and wishes to proceed. The entire vial of Eylea was drawn up into a syringe. The opened vial, remaining drug, and filtered needle were disposed of in a certified biohazard container. Betadine prep was performed. Topical anesthesia was induced with Alcaine. 4% lidocaine pledge. A lid speculum was used. A short 30g needle on a 1cc syringe was used with product that that had previously been prepared under sterile conditions. Injection site: 3-4 mm from the limbus. The used syringe/needle was transferred to a biohazard container. Lid speculum removed. Mask worn during procedure. Patient tolerated procedure well. Count fingers vision was verified. There were no complications. Patient was given the standard instruction sheet. Patient given office phone number/answering service number and advised to call immediately should there be loss of vision or pain, or should they have any other questions or concerns. Viral Torres

## 2019-12-17 NOTE — PROCEDURE NOTE: CLINICAL
PROCEDURE NOTE: Eylea 2mg OD. Diagnosis: Neovascular AMD with Active CNV. Prep: Betadine Drops and Betadine Scrub. Prior to injection, risks/benefits/alternatives discussed including corneal abrasion, infection, loss of vision, hemorrhage, cataract, glaucoma, retinal tears or detachment. A written consent is on file, and the need for today’s injection was discussed and the patient is understanding and wishes to proceed. The entire vial of Eylea was drawn up into a syringe. The opened vial, remaining drug, and filtered needle were disposed of in a certified biohazard container. Betadine prep was performed. Topical anesthesia was induced with Alcaine. 4% lidocaine pledge. A lid speculum was used. A short 30g needle on a 1cc syringe was used with product that that had previously been prepared under sterile conditions. Injection site: 3-4 mm from the limbus. The used syringe/needle was transferred to a biohazard container. Lid speculum removed. Mask worn during procedure. Patient tolerated procedure well. Count fingers vision was verified. There were no complications. Patient was given the standard instruction sheet. Patient given office phone number/answering service number and advised to call immediately should there be loss of vision or pain, or should they have any other questions or concerns. Edna Boss

## 2019-12-18 ENCOUNTER — IMPORTED ENCOUNTER (OUTPATIENT)
Dept: URBAN - METROPOLITAN AREA CLINIC 31 | Facility: CLINIC | Age: 39
End: 2019-12-18

## 2019-12-18 PROCEDURE — 92014 COMPRE OPH EXAM EST PT 1/>: CPT

## 2019-12-18 PROCEDURE — 92015 DETERMINE REFRACTIVE STATE: CPT

## 2020-01-14 NOTE — PROCEDURE NOTE: CLINICAL
PROCEDURE NOTE: Eylea 2mg OS. Diagnosis: Neovascular AMD with Active CNV. Prep: Betadine Drops and Betadine Scrub. Prior to injection, risks/benefits/alternatives discussed including corneal abrasion, infection, loss of vision, hemorrhage, cataract, glaucoma, retinal tears or detachment. A written consent is on file, and the need for today’s injection was discussed and the patient is understanding and wishes to proceed. The entire vial of Eylea was drawn up into a syringe. The opened vial, remaining drug, and filtered needle were disposed of in a certified biohazard container. Betadine prep was performed. Topical anesthesia was induced with Alcaine. 4% lidocaine pledge. A lid speculum was used. A short 30g needle on a 1cc syringe was used with product that that had previously been prepared under sterile conditions. Injection site: 3-4 mm from the limbus. The used syringe/needle was transferred to a biohazard container. Lid speculum removed. Mask worn during procedure. Patient tolerated procedure well. Count fingers vision was verified. There were no complications. Patient was given the standard instruction sheet. Patient given office phone number/answering service number and advised to call immediately should there be loss of vision or pain, or should they have any other questions or concerns. Viral Torres

## 2020-02-19 NOTE — PATIENT DISCUSSION
Decision to treat was made based on today's clinical findings.  Decreased PED/SRF, improved, continue with injection today.

## 2020-02-19 NOTE — PATIENT DISCUSSION
Angel, Discussed the importance of blood pressure control in the prevention of ocular complications.

## 2020-02-19 NOTE — PROCEDURE NOTE: CLINICAL
PROCEDURE NOTE: Eylea Prefilled Syringe 2mg OD. Diagnosis: Neovascular AMD with Active CNV. Prior to injection, risks/benefits/alternatives discussed including corneal abrasion, infection, loss of vision, hemorrhage, cataract, glaucoma, retinal tears or detachment. A written consent is on file, and the need for today's injection was discussed and the patient is understanding and wishes to proceed. A 30G needle was placed on an Eylea 2mg/0.05ml Pre-filled Syringe. Betadine prep was performed. Topical anesthesia was induced with Alcaine. 4% lidocaine pledge. A lid speculum was used. An intravitreal injection of Eylea was given. Injection site: 3-4 mm from the limbus. The used syringe/needle was transferred to a biohazard container. Lid speculum removed. Mask worn during procedure. Patient tolerated procedure well. Count fingers vision was verified. There were no complications. Patient was given the standard instruction sheet. Edna Boss

## 2020-02-21 NOTE — PROCEDURE NOTE: CLINICAL

## 2020-03-13 NOTE — PATIENT DISCUSSION
Call immediately to report any decreased vision or visual distortion. 55 y/o M with unknown PMHx is BIB EMS for AMS s/p alcohol intoxication. Pt admits to heavy alcohol use today. He is currently vomiting actively at triage. Pt denies any other medical complaints including CP, SOB, fevers and chills. Unable to obtain remainder of HPI due to his current status.

## 2020-03-18 NOTE — PROCEDURE NOTE: CLINICAL
PROCEDURE NOTE: Eylea Prefilled Syringe 2mg OD. Diagnosis: Neovascular AMD with Active CNV. Prior to injection, risks/benefits/alternatives discussed including corneal abrasion, infection, loss of vision, hemorrhage, cataract, glaucoma, retinal tears or detachment. A written consent is on file, and the need for today's injection was discussed and the patient is understanding and wishes to proceed. A 30G needle was placed on an Eylea 2mg/0.05ml Pre-filled Syringe. Betadine prep was performed. Topical anesthesia was induced with Alcaine. 4% lidocaine pledge. A lid speculum was used. An intravitreal injection of Eylea was given. Injection site: 3-4 mm from the limbus. The used syringe/needle was transferred to a biohazard container. Lid speculum removed. Mask worn during procedure. Patient tolerated procedure well. Count fingers vision was verified. There were no complications. Patient was given the standard instruction sheet. Galen Tyson

## 2020-03-18 NOTE — PATIENT DISCUSSION
Decreased PED, improved. Eylea injection recommended today after discussion of benefits, risks, alternatives. The injection was administered without complication. Post-injection instructions were reviewed and understood by the patient.

## 2020-04-01 NOTE — PROCEDURE NOTE: CLINICAL
PROCEDURE NOTE: Eylea Prefilled Syringe 2mg OS. Diagnosis: Neovascular AMD with Active CNV. Prior to injection, risks/benefits/alternatives discussed including corneal abrasion, infection, loss of vision, hemorrhage, cataract, glaucoma, retinal tears or detachment. A written consent is on file, and the need for today's injection was discussed and the patient is understanding and wishes to proceed. A 30G needle was placed on an Eylea 2mg/0.05ml Pre-filled Syringe. Betadine prep was performed. Topical anesthesia was induced with Alcaine. 4% lidocaine pledge. A lid speculum was used. An intravitreal injection of Eylea was given. Injection site: 3-4 mm from the limbus. The used syringe/needle was transferred to a biohazard container. Lid speculum removed. Mask worn during procedure. Patient tolerated procedure well. Count fingers vision was verified. There were no complications. Patient was given the standard instruction sheet. Patti Natarajan

## 2020-04-16 NOTE — PATIENT DISCUSSION
Slightly increased PED, worse. Usrzula Loach injection recommended today after discussion of benefits, risks, alternatives. The injection was administered without complication. Post-injection instructions were reviewed and understood by the patient.

## 2020-04-16 NOTE — PROCEDURE NOTE: CLINICAL
PROCEDURE NOTE: Eylea Prefilled Syringe 2mg OD. Diagnosis: Neovascular AMD with Active CNV. Prior to injection, risks/benefits/alternatives discussed including corneal abrasion, infection, loss of vision, hemorrhage, cataract, glaucoma, retinal tears or detachment. A written consent is on file, and the need for today's injection was discussed and the patient is understanding and wishes to proceed. A 30G needle was placed on an Eylea 2mg/0.05ml Pre-filled Syringe. Betadine prep was performed. Topical anesthesia was induced with Alcaine. 4% lidocaine pledge. A lid speculum was used. An intravitreal injection of Eylea was given. Injection site: 3-4 mm from the limbus. The used syringe/needle was transferred to a biohazard container. Lid speculum removed. Mask worn during procedure. Patient tolerated procedure well. Count fingers vision was verified. There were no complications. Patient was given the standard instruction sheet. Parul Hardwick

## 2020-05-13 NOTE — PROCEDURE NOTE: CLINICAL

## 2020-05-27 NOTE — PATIENT DISCUSSION
Decreased PED, improved.  Patient here for treatment only as previously discussed at last visit.  Pt to return in 6 weeks for Focal Micro pulseLaser treatment.

## 2020-05-27 NOTE — PROCEDURE NOTE: CLINICAL
PROCEDURE NOTE: Eylea Prefilled Syringe 2mg OD. Diagnosis: Neovascular AMD with Active CNV. Prior to injection, risks/benefits/alternatives discussed including corneal abrasion, infection, loss of vision, hemorrhage, cataract, glaucoma, retinal tears or detachment. A written consent is on file, and the need for today's injection was discussed and the patient is understanding and wishes to proceed. A 30G needle was placed on an Eylea 2mg/0.05ml Pre-filled Syringe. Betadine prep was performed. Topical anesthesia was induced with Alcaine. 4% lidocaine pledge. A lid speculum was used. An intravitreal injection of Eylea was given. Injection site: 3-4 mm from the limbus. The used syringe/needle was transferred to a biohazard container. Lid speculum removed. Mask worn during procedure. Patient tolerated procedure well. Count fingers vision was verified. There were no complications. Patient was given the standard instruction sheet. Tiffani Loving PROCEDURE NOTE: Focal Laser, Retina OS. Diagnosis: Retinal Edema. Anesthesia: Topical. Prior to focal laser, risks/benefits/alternatives to laser discussed including loss of vision and patient wished to proceed. Spot size: 200 um. Power: 300 mW. Number of pulses: 80. Patient tolerated procedure well. There were no complications. Post procedure instructions given. Tiffani Loving

## 2020-06-25 NOTE — PATIENT DISCUSSION
Dr. Denver Underwood discussed with patient due to head trauma, instructed her to come back in 1 month for a dilated exam. pupil's are the same in each eye on exam , CT showed no pathology

## 2020-06-30 NOTE — PATIENT DISCUSSION
Decreased PED,Edema, improved.  Patient here for laser treatment as previously discussed at last visit.

## 2020-06-30 NOTE — PROCEDURE NOTE: CLINICAL
PROCEDURE NOTE: Eylea Prefilled Syringe 2mg OS. Diagnosis: Neovascular AMD with Active CNV. Prior to injection, risks/benefits/alternatives discussed including corneal abrasion, infection, loss of vision, hemorrhage, cataract, glaucoma, retinal tears or detachment. A written consent is on file, and the need for today's injection was discussed and the patient is understanding and wishes to proceed. A 30G needle was placed on an Eylea 2mg/0.05ml Pre-filled Syringe. Betadine prep was performed. Topical anesthesia was induced with Alcaine. 4% lidocaine pledge. A lid speculum was used. An intravitreal injection of Eylea was given. Injection site: 3-4 mm from the limbus. The used syringe/needle was transferred to a biohazard container. Lid speculum removed. Mask worn during procedure. Patient tolerated procedure well. Count fingers vision was verified. There were no complications. Patient was given the standard instruction sheet. Savanah Bowens PROCEDURE NOTE: Focal Laser, Retina OD. Diagnosis: Retinal Edema. Anesthesia: Topical. Prior to focal laser, risks/benefits/alternatives to laser discussed including loss of vision and patient wished to proceed. Spot size: 150 um. Power: 300 mW. Number of pulses: 50. Patient tolerated procedure well. There were no complications. Post procedure instructions given. Savanah Pollen

## 2020-07-28 NOTE — PROCEDURE NOTE: CLINICAL
PROCEDURE NOTE: Eylea Prefilled Syringe 2mg OD. Diagnosis: Neovascular AMD with Active CNV. Prior to injection, risks/benefits/alternatives discussed including corneal abrasion, infection, loss of vision, hemorrhage, cataract, glaucoma, retinal tears or detachment. A written consent is on file, and the need for today's injection was discussed and the patient is understanding and wishes to proceed. A 30G needle was placed on an Eylea 2mg/0.05ml Pre-filled Syringe. Betadine prep was performed. Topical anesthesia was induced with Alcaine. 4% lidocaine pledge. A lid speculum was used. An intravitreal injection of Eylea was given. Injection site: 3-4 mm from the limbus. The used syringe/needle was transferred to a biohazard container. Lid speculum removed. Mask worn during procedure. Patient tolerated procedure well. Count fingers vision was verified. There were no complications. Patient was given the standard instruction sheet. Isaac Jones

## 2020-08-05 NOTE — PROCEDURE NOTE: CLINICAL
PROCEDURE NOTE: Eylea Prefilled Syringe 2mg OS. Diagnosis: Neovascular AMD with Active CNV. Prior to injection, risks/benefits/alternatives discussed including corneal abrasion, infection, loss of vision, hemorrhage, cataract, glaucoma, retinal tears or detachment. A written consent is on file, and the need for today's injection was discussed and the patient is understanding and wishes to proceed. A 30G needle was placed on an Eylea 2mg/0.05ml Pre-filled Syringe. Betadine prep was performed. Topical anesthesia was induced with Alcaine. 4% lidocaine pledge. A lid speculum was used. An intravitreal injection of Eylea was given. Injection site: 3-4 mm from the limbus. The used syringe/needle was transferred to a biohazard container. Lid speculum removed. Mask worn during procedure. Patient tolerated procedure well. Count fingers vision was verified. There were no complications. Patient was given the standard instruction sheet. Savanah Pollen

## 2020-08-25 NOTE — PATIENT DISCUSSION
Decreased PED/SRF, improved.  Patient to continue interval q 6 weeks.  Pt to return treatment only in 3 weeks.

## 2020-08-25 NOTE — PROCEDURE NOTE: CLINICAL
PROCEDURE NOTE: Eylea Prefilled Syringe 2mg OD. Diagnosis: Neovascular AMD with Active CNV. Prior to injection, risks/benefits/alternatives discussed including corneal abrasion, infection, loss of vision, hemorrhage, cataract, glaucoma, retinal tears or detachment. A written consent is on file, and the need for today's injection was discussed and the patient is understanding and wishes to proceed. A 30G needle was placed on an Eylea 2mg/0.05ml Pre-filled Syringe. Betadine prep was performed. Topical anesthesia was induced with Alcaine. 4% lidocaine pledge. A lid speculum was used. An intravitreal injection of Eylea was given. Injection site: 3-4 mm from the limbus. The used syringe/needle was transferred to a biohazard container. Lid speculum removed. Mask worn during procedure. Patient tolerated procedure well. Count fingers vision was verified. There were no complications. Patient was given the standard instruction sheet. Maki Warner

## 2020-09-16 NOTE — PROCEDURE NOTE: CLINICAL

## 2020-09-22 NOTE — PROCEDURE NOTE: CLINICAL
PROCEDURE NOTE: Eylea Prefilled Syringe 2mg OD. Diagnosis: Neovascular AMD with Active CNV. Prior to injection, risks/benefits/alternatives discussed including corneal abrasion, infection, loss of vision, hemorrhage, cataract, glaucoma, retinal tears or detachment. A written consent is on file, and the need for today's injection was discussed and the patient is understanding and wishes to proceed. A 30G needle was placed on an Eylea 2mg/0.05ml Pre-filled Syringe. Betadine prep was performed. Topical anesthesia was induced with Alcaine. 4% lidocaine pledge. A lid speculum was used. An intravitreal injection of Eylea was given. Injection site: 3-4 mm from the limbus. The used syringe/needle was transferred to a biohazard container. Lid speculum removed. Mask worn during procedure. Patient tolerated procedure well. Count fingers vision was verified. There were no complications. Patient was given the standard instruction sheet. [facility]

## 2020-10-20 NOTE — PROCEDURE NOTE: CLINICAL
PROCEDURE NOTE: Eylea Prefilled Syringe 2mg OD. Diagnosis: Neovascular AMD with Active CNV. Prior to injection, risks/benefits/alternatives discussed including corneal abrasion, infection, loss of vision, hemorrhage, cataract, glaucoma, retinal tears or detachment. A written consent is on file, and the need for today's injection was discussed and the patient is understanding and wishes to proceed. A 30G needle was placed on an Eylea 2mg/0.05ml Pre-filled Syringe. Betadine prep was performed. Topical anesthesia was induced with Alcaine. 4% lidocaine pledge. A lid speculum was used. An intravitreal injection of Eylea was given. Injection site: 3-4 mm from the limbus. The used syringe/needle was transferred to a biohazard container. Lid speculum removed. Mask worn during procedure. Patient tolerated procedure well. Count fingers vision was verified. There were no complications. Patient was given the standard instruction sheet. Sujata Corley

## 2020-10-28 NOTE — PROCEDURE NOTE: CLINICAL

## 2020-11-17 NOTE — PROCEDURE NOTE: CLINICAL

## 2020-12-09 NOTE — PROCEDURE NOTE: CLINICAL

## 2020-12-15 NOTE — PROCEDURE NOTE: CLINICAL

## 2021-01-12 NOTE — PROCEDURE NOTE: CLINICAL

## 2021-01-20 NOTE — PROCEDURE NOTE: CLINICAL

## 2021-03-03 NOTE — PROCEDURE NOTE: CLINICAL

## 2021-03-09 NOTE — PROCEDURE NOTE: CLINICAL
PROCEDURE NOTE: Eylea Prefilled Syringe 2mg OD. Diagnosis: Neovascular AMD with Active CNV. Prep: Betadine Drops and Betadine Scrub.

## 2021-03-31 NOTE — PROCEDURE NOTE: CLINICAL

## 2021-04-06 NOTE — PROCEDURE NOTE: CLINICAL

## 2021-04-28 NOTE — PROCEDURE NOTE: CLINICAL

## 2021-05-10 ENCOUNTER — IMPORTED ENCOUNTER (OUTPATIENT)
Dept: URBAN - METROPOLITAN AREA CLINIC 31 | Facility: CLINIC | Age: 41
End: 2021-05-10

## 2021-05-10 PROCEDURE — 92015 DETERMINE REFRACTIVE STATE: CPT

## 2021-05-10 PROCEDURE — 92014 COMPRE OPH EXAM EST PT 1/>: CPT

## 2021-05-26 NOTE — PATIENT DISCUSSION
Symptoms of retinal detachment and endophthalmitis following intravitreal injection discussed; patient advised to call immediately if symptoms ensue. Herman

## 2021-05-26 NOTE — PROCEDURE NOTE: CLINICAL

## 2021-06-01 NOTE — PROCEDURE NOTE: CLINICAL
PROCEDURE NOTE: Eylea Prefilled Syringe 2mg Prior to injection, risks/benefits/alternatives discussed including corneal abrasion, infection, loss of vision, hemorrhage, cataract, glaucoma, retinal tears or detachment. A written consent is on file, and the need for today's injection was discussed and the patient is understanding and wishes to proceed. A 30G needle was placed on an Eylea 2mg/0.05ml Pre-filled Syringe. Betadine prep was performed. Topical anesthesia was induced with Alcaine. 4% lidocaine pledge. A lid speculum was used. An intravitreal injection of Eylea was given. Injection site: 3-4 mm from the limbus. The used syringe/needle was transferred to a biohazard container. Lid speculum removed. Mask worn during procedure. Patient tolerated procedure well. Count fingers vision was verified. There were no complications. Patient was given the standard instruction sheet. Nelson Castillo

## 2021-06-23 NOTE — PROCEDURE NOTE: CLINICAL

## 2021-07-07 NOTE — PATIENT DISCUSSION
Slightly Increased PED, No Fluid, worse, No treatment today,   Continue treatment interval Q 6 weeks.  Patient to return as scheduled.

## 2021-08-09 NOTE — PATIENT DISCUSSION
Avtar Cooley (:  1967) is a 62 y.o. female,     Chief Complaint   Patient presents with    Heart Murmur     OV for follow up [facility]. Pt right wrist healed well. Pt has not been taking ASA, Lipitor, or any Diabetes medicine. States the medicine was not agreeing with her. Pt denies any chest pain,SOB,dizziness,swelling to ankles, or palpitations.  Hypertension    Hyperlipidemia    Follow-Up from Hospital     Patient is here for follow up for multiple coronary artery disease risk factors and anxiety panic disorder. She had a cardiac cath done by Dr. Jean-Claude Aceves on 2021 which reported normal coronary arteries. She is here for follow-up with much of questions. She has been to her psychologist this morning for anxiety disorder. She says she stopped taking aspirin and atorvastatin as well as some of the diabetes medicines which are not agreeing with her. She stopped atorvastatin because of severe leg pains. They resolved after she stopped medications last week. She is a former smoker. Allergies   Allergen Reactions    Latex      \"Redness\"    Atorvastatin      Severe myalgias    Codeine      \"I Don't Remember The Reaction , I Was A Child\"    Darvon [Propoxyphene]      \"I Don't Remember The Reaction, I Was A Child\"    Pcn [Penicillins]      \"I Don't Remember The Reaction, I Was A Child\"     Prior to Admission medications    Medication Sig Start Date End Date Taking? Authorizing Provider   montelukast (SINGULAIR) 10 MG tablet TAKE 1 TABLET BY MOUTH DAILY 21  Yes Gilford Lime, MD   nitroGLYCERIN (NITROSTAT) 0.4 MG SL tablet up to max of 3 total doses.  If no relief after 1 dose, call 911. 21  Yes Mary Smith MD   letrozole [facility]) 2.5 MG tablet TAKE ONE TABLET BY MOUTH EVERY DAY 21  Yes Jake King MD   budesonide-formoterol (SYMBICORT) 160-4.5 MCG/ACT AERO Inhale 2 puffs into the lungs 2 times daily 21  Yes Gilford Lime, MD   pantoprazole (PROTONIX) 40 MG Monitor. tablet  4/12/21  Yes Historical Provider, MD   loperamide (RA ANTI-DIARRHEAL) 2 MG capsule Take 1 capsule by mouth 4 times daily as needed for Diarrhea 3/29/21  Yes Mariela Hargrove MD   fexofenadine TY [facility] ALLERGY) 60 MG tablet Take 60 mg by mouth every 12 hours as needed    Yes Historical Provider, MD   FLUoxetine (PROZAC) 10 MG capsule Take 1 capsule by mouth daily 4/22/20  Yes Historical Provider, MD   hydroCHLOROthiazide (MICROZIDE) 12.5 MG capsule Take 1 capsule by mouth daily 5/1/20  Yes Historical Provider, MD   hydrOXYzine (VISTARIL) 25 MG capsule Take 1 capsule by mouth daily 4/23/20  Yes Historical Provider, MD   losartan (COZAAR) 100 MG tablet Take 1 tablet by mouth daily 1/22/20  Yes Historical Provider, MD   SITagliptin (JANUVIA) 100 MG tablet Take 100 mg by mouth daily  Patient not taking: Reported on 8/9/2021    Historical Provider, MD   aspirin 81 MG chewable tablet Take 1 tablet by mouth daily  Patient not taking: Reported on 8/9/2021 6/6/21   Aime Hadrwick MD   metFORMIN (GLUCOPHAGE) 500 MG tablet Take 500 mg by mouth daily (with breakfast)   Patient not taking: Reported on 8/9/2021    Historical Provider, MD     Past Medical History:   Diagnosis Date    Abnormal ECG 01/29/2019    Acid reflux     Anxiety     Arthritis     \"Back, Legs, Knees And Feet\"    Back problem     \"Three Discs Smashed Together Lower Back\"    Bipolar disorder ([address]     Chronic back pain     Chronic cough 01/31/2020    Class 3 severe obesity with body mass index (BMI) of 40.0 to 44.9 in adult ([address]     COPD, moderate ([address] 01/31/2020    Curvature of spine     \"Wore A Brace For Three Years\"    Depression     Diabetes mellitus ([address] Dx 2010    Diverticulitis     Esophageal dilatation 04/12/2017    Family history of sudden death 01/29/2019    Mother at age 58    Full dentures     H/O 24 hour EKG monitoring 01/29/2019    Conclusion: Normal study suggesting normal sinus rhythm with no clinically significant arrhythmias.  H/O cardiovascular stress test 02/12/2019    EF65% Normal    H/O echocardiogram 06/02/2021    Normal left ventricle structure and systolic function with an ejection 55-60% No significant valvular disease noted.  WBEJZLBQ(680.2)     \"Occ\"    Heart murmur     No Cardiologist At This Time    History of nuclear stress test 02/12/2019    EF 65%. Normal study.  [place] (hard of hearing)     Bilateral Hearing Aides    Hydronephrosis     dx with hydronephrosis with admission 6/2018- right ureteral stone/\"had kidney stone stone about 2-3 yrs ago and passed it\"    Hyperlipidemia     Hypertension     Macular degeneration     Right Eye    Mild persistent asthma without complication 45/52/1112    MRSA (methicillin resistant Staphylococcus aureus)     After PRICILLA In 2006 (Abdomen)    Near syncope 01/29/2019 1/28/19 in ED [place]    Panic attacks     PONV (postoperative nausea and vomiting)     denies any motion sickness    Right Breast Cancer Dx 8-15    Right Breast Lumpectomy, Had Chemotherapy And Radiation Treatments    S/P exploratory laparotomy 10/18/2020    explore lap for small bowel perf repair and abdominal wall hernia repair    Seizure ([address]     \"when I was a young kid had seizure and was on medication- not sure how old when last seizure but as a kid\"    Sepsis ([address]     per old chart - admitted 6/2018 with sepsis secondary to wound infection    Shortness of breath 02/04/2020    Sleep apnea     Has CPAP- last sleep study 2-3 yrs ago\"    Thyroid disease     \"Thyroid Problems As A Child\"    Type 2 diabetes mellitus without complication, without long-term current use of insulin ([address] 7/15/2021    Urinary incontinence     WD-Soft tissue radionecrosis 10/28/2016    WD-Wound of right breast 10/28/2016    Wears glasses       Vitals:    08/09/21 1353   BP: 120/68   Pulse: 90   Weight: 271 lb (122.9 kg)   Height: 5' 7\" (1.702 m)      Body mass index is 42.44 kg/m².   Wt Readings from Last 3 Encounters:   08/09/21 271 lb (122.9 kg)   08/05/21 274 lb (124.3 kg)   07/15/21 272 lb (123.4 kg)     Pertinent records reviewed and discussed with patient and results are as follow:  Cardiac cath on August 5, 2021 reported normal coronaries, normal LVEDP     Right radial access site has healed well. Lab Results   Component Value Date    WBC 7.7 08/02/2021    HGB 12.7 08/02/2021    HCT 39.7 08/02/2021     08/02/2021     Lab Results   Component Value Date    CHOL 200 (H) 10/01/2012    CHOLFAST 204 (H) 06/05/2021    TRIG 127 10/01/2012    TRIGLYCFAST 159 (H) 06/05/2021    HDL 68 06/05/2021    LDLDIRECT 132 (H) 06/05/2021     Lab Results   Component Value Date    BUN 10 08/02/2021    CREATININE 0.7 08/02/2021     08/02/2021    K 4.0 08/02/2021     Lab Results   Component Value Date    INR 1.27 06/11/2018     Lab Results   Component Value Date    LABA1C 7.1 (H) 06/05/2021     ASSESSMENT/PLAN:    1. Type 2 diabetes mellitus without complication, without long-term current use of insulin ([address]  2. Family history of sudden death  1. Dyspnea on exertion      Ok to stop Aspirin and Atorvastatin. Continue diabetes therapy as per PCP. Primary prevention is the goal by aggressive risk modification, healthy and therapeutic life style changes for cardiovascular risk reduction. Various goals are discussed and questions answered. Follow-up with PCP and see me as,  needed. On this date 8/9/2021 I have spent 15 minutes reviewing previous notes, test results and face to face with the patient discussing the diagnosis and importance of compliance with the treatment plan as well as documenting on the day of the visit. An electronic signature was used to authenticate this note.     --Neel Elias MD

## 2021-08-24 NOTE — PROCEDURE NOTE: CLINICAL

## 2021-09-21 NOTE — PROCEDURE NOTE: CLINICAL

## 2021-09-29 NOTE — PROCEDURE NOTE: CLINICAL

## 2021-10-27 NOTE — PROCEDURE NOTE: CLINICAL

## 2021-11-03 NOTE — PATIENT DISCUSSION
OTC Reading glasses recommended. Patient with one or more new problems requiring additional work-up/treatment.

## 2021-11-16 NOTE — PROCEDURE NOTE: CLINICAL

## 2021-12-08 NOTE — PROCEDURE NOTE: CLINICAL

## 2022-01-19 NOTE — PROCEDURE NOTE: CLINICAL

## 2022-03-08 NOTE — PROCEDURE NOTE: CLINICAL

## 2022-03-16 NOTE — PROCEDURE NOTE: CLINICAL

## 2022-03-24 NOTE — PATIENT DISCUSSION
Impression: Age-related nuclear cataract, left eye: H25.12. Plan: Patient advised there is a cataract, but that visual functioning is good, and cataract surgery is not required at this time. Further advised there is no specific urgency for cataract surgery. They were also advised that having cataract surgery does not mean they will not need to use glasses or contact lenses. We will continue to monitor and they are advised to call or return if any new symptoms. Recommended observation.

## 2022-04-02 ASSESSMENT — VISUAL ACUITY
OS_CC: J1+14"
OS_SC: 20/20
OD_SC: 20/20
OS_CC: J118''
OD_CC: J1+14''
OS_CC: J118''
OD_SC: 20/20
OD_CC: J1+18''
OD_CC: J114"
OD_CC: J118''
OS_CC: J1+14''
OS_SC: 20/20
OS_CC: 20/400
OD_CC: 20/400
OD_SC: 20/20
OS_SC: 20/20
OS_SC: 20/25-2
OD_SC: 20/20-3

## 2022-04-02 ASSESSMENT — TONOMETRY
OD_IOP_MMHG: 13
OS_IOP_MMHG: 12
OS_IOP_MMHG: 13
OS_IOP_MMHG: 12
OS_IOP_MMHG: 12
OD_IOP_MMHG: 12
OD_IOP_MMHG: 12
OD_IOP_MMHG: 11

## 2022-04-13 NOTE — PATIENT DISCUSSION
An examination that was significantly and separately identifiable from the procedure performed today was also completed for this Sebaceous Cyst, Monitor.

## 2022-04-13 NOTE — PATIENT DISCUSSION
Not active, Discussed with the patient the importance of good control of their blood sugar, blood pressure, cholesterol, diet, exercise, weight, and medication usage under the guidance of their diabetic doctor to prevent/halt diabetic retinopathy.

## 2022-04-22 NOTE — PROCEDURE NOTE: CLINICAL

## 2022-04-22 NOTE — PATIENT DISCUSSION
Based on today's exam, diagnostic studies, and/or review of records, determination was made for observation and follow-up.

## 2022-05-25 NOTE — PATIENT DISCUSSION
Not Active, No DME, Discussed the importance of blood sugar control in the prevention of ocular complications. Additional Area 4 Location: Depressor Labii Inferioris (for an asymmetrical lower smile)

## 2022-05-25 NOTE — PROCEDURE NOTE: CLINICAL

## 2022-06-08 NOTE — PATIENT DISCUSSION
Not Active, No DME, Discussed the importance of blood sugar control in the prevention of ocular complications.

## 2022-06-08 NOTE — PROCEDURE NOTE: CLINICAL

## 2022-06-08 NOTE — PATIENT DISCUSSION
An examination that was significantly and separately identifiable from the procedure performed today was also completed for Blepharitis.

## 2022-07-13 ENCOUNTER — PREPPED CHART (OUTPATIENT)
Dept: URBAN - METROPOLITAN AREA CLINIC 31 | Facility: CLINIC | Age: 42
End: 2022-07-13

## 2022-07-20 NOTE — PATIENT DISCUSSION
An examination that was significantly and separately identifiable from the procedure performed today was also completed for Visual Disturbance.

## 2022-07-20 NOTE — PATIENT DISCUSSION
Discussed with the patient the importance of good control of their blood sugar, blood pressure, cholesterol, diet, exercise, weight, and medication usage under the guidance of their diabetic doctor to prevent/halt diabetic retinopathy. rx sent. Will be due med check before next refill.

## 2022-07-20 NOTE — PATIENT DISCUSSION
Pt C/O A new floater in the right eye, Likely due to AMD. Scleral Depressed 360, No RT/RD seen on todays exam.

## 2022-07-28 NOTE — PATIENT DISCUSSION
Retinal detachment warnings given. [FreeTextEntry1] : annual CPE [de-identified] : no current complaints\par no meds\par preparing to run marathon

## 2022-08-03 NOTE — PROCEDURE NOTE: CLINICAL

## 2022-08-17 NOTE — PROCEDURE NOTE: CLINICAL

## 2022-09-12 NOTE — PATIENT DISCUSSION
An examination that was significantly and separately identifiable from the procedure performed today was also completed for Hypertensive Retinopathy. English

## 2022-09-12 NOTE — PROCEDURE NOTE: CLINICAL

## 2022-10-17 NOTE — PATIENT DISCUSSION
Discussed the importance of blood sugar control in the prevention of ocular complications. Orthopedic

## 2022-11-09 NOTE — PROCEDURE NOTE: CLINICAL
PROCEDURE NOTE: Eylea Prefilled Syringe 2mg OD. Diagnosis: Neovascular AMD with Active CNV. Prep: Betadine Drops and Betadine Scrub. Prior to injection, risks/benefits/alternatives discussed including corneal abrasion, infection, loss of vision, hemorrhage, cataract, glaucoma, retinal tears or detachment. A written consent is on file, and the need for today's injection was discussed and the patient is understanding and wishes to proceed. A 30G needle was placed on an Eylea 2mg/0.05ml Pre-filled Syringe. Proparicaine, Betadine, Aktin, and additional betadine. A lid speculum was used. An intravitreal injection of Eylea was given. Injection site: 3-4 mm from the limbus. The used syringe/needle was transferred to a biohazard container. Lid speculum removed. Mask worn during procedure. Patient tolerated procedure well. Count fingers vision was verified. There were no complications. Patient was given the standard instruction sheet. Cynthia Guerrero

## 2022-11-15 NOTE — PROCEDURE NOTE: CLINICAL

## 2022-11-29 ENCOUNTER — ESTABLISHED PATIENT (OUTPATIENT)
Dept: URBAN - METROPOLITAN AREA CLINIC 31 | Facility: CLINIC | Age: 42
End: 2022-11-29

## 2022-11-29 DIAGNOSIS — H52.13: ICD-10-CM

## 2022-11-29 PROCEDURE — 92015 DETERMINE REFRACTIVE STATE: CPT

## 2022-11-29 PROCEDURE — 92014 COMPRE OPH EXAM EST PT 1/>: CPT

## 2022-11-29 ASSESSMENT — VISUAL ACUITY
OD_CC: J1+
OU_SC: J1+
OS_SC: J1+
OD_SC: J1+
OU_CC: J1+
OS_CC: J1+
OD_CC: 20/25
OS_CC: 20/20

## 2022-11-29 ASSESSMENT — TONOMETRY
OS_IOP_MMHG: 11
OD_IOP_MMHG: 11

## 2022-12-14 NOTE — PROCEDURE NOTE: CLINICAL

## 2022-12-14 NOTE — PATIENT DISCUSSION
An examination that was significantly and separately identifiable from the procedure performed today was also completed for New Visual Disturbance.

## 2022-12-21 NOTE — PROCEDURE NOTE: CLINICAL
PROCEDURE NOTE: Eylea Prefilled Syringe 2mg OD. Diagnosis: Neovascular AMD with Active CNV. Prep: Betadine Drops and Betadine Scrub. The patient was identified visually and verbally by the  surgeon. The procedure eye was marked with an adhesive arrow sticker. The  risks, benefits, alternatives and possible complications of the procedure  were discussed with the patient and informed consent was obtained. The  patient was positioned in the chair. Proparacaine, Betadine, Akten administered prior to injection. Additional Betadine was used. A speculum  was used to hold the eyelid open. A caliper was used to marked the site of  injection 3.5-4mm from the limbus. Betadine was placed on the site . A sterile 30 or 31 gauge needle with the  medication entered the vitreous cavity and the medication was  administered. The speculum was removed. The patient was instructed to call immediately if any  significant visual loss, swelling, discharge, or pain occurred Intravitreal injection. Patient tolerated the procedure well. There were no complications. CF vision checked. Post procedure instructions given. Patti Natarajan

## 2024-01-01 NOTE — PROCEDURE NOTE: CLINICAL
PROCEDURE NOTE: Eylea 2mg OD. Diagnosis: Neovascular AMD with Active CNV. Prior to injection, risks/benefits/alternatives discussed including corneal abrasion, infection, loss of vision, hemorrhage, cataract, glaucoma, retinal tears or detachment. A written consent is on file, and the need for today’s injection was discussed and the patient is understanding and wishes to proceed. The entire vial of Eylea was drawn up into a syringe. The opened vial, remaining drug, and filtered needle were disposed of in a certified biohazard container. Betadine prep was performed. Topical anesthesia was induced with Alcaine. 4% lidocaine pledge. A lid speculum was used. A short 30g needle on a 1cc syringe was used with product that that had previously been prepared under sterile conditions. Injection site: 3-4 mm from the limbus. The used syringe/needle was transferred to a biohazard container. Lid speculum removed. Mask worn during procedure. Patient tolerated procedure well. Count fingers vision was verified. There were no complications. Patient was given the standard instruction sheet. Patient given office phone number/answering service number and advised to call immediately should there be loss of vision or pain, or should they have any other questions or concerns. Patti Natarajan
Statement Selected

## 2024-01-31 ENCOUNTER — COMPREHENSIVE EXAM (OUTPATIENT)
Dept: URBAN - METROPOLITAN AREA CLINIC 31 | Facility: CLINIC | Age: 44
End: 2024-01-31

## 2024-01-31 DIAGNOSIS — H52.223: ICD-10-CM

## 2024-01-31 DIAGNOSIS — H52.13: ICD-10-CM

## 2024-01-31 PROCEDURE — 92014 COMPRE OPH EXAM EST PT 1/>: CPT

## 2024-01-31 PROCEDURE — 92015 DETERMINE REFRACTIVE STATE: CPT

## 2024-01-31 ASSESSMENT — VISUAL ACUITY
OD_CC: 20/30
OU_CC: 20/25
OS_CC: 20/25
OU_CC: J1
OS_CC: J2
OD_CC: J2

## 2024-01-31 ASSESSMENT — TONOMETRY
OD_IOP_MMHG: 13
OS_IOP_MMHG: 13

## 2024-02-23 ENCOUNTER — CONTACT LENSES/GLASSES VISIT (OUTPATIENT)
Dept: URBAN - METROPOLITAN AREA CLINIC 31 | Facility: CLINIC | Age: 44
End: 2024-02-23

## 2024-02-23 DIAGNOSIS — H52.13: ICD-10-CM

## 2024-02-23 PROCEDURE — 92015GRNC REFRACTION GLASSES RECHECK - NO CHARGE

## 2024-02-23 ASSESSMENT — VISUAL ACUITY
OD_CC: 20/30
OS_CC: 20/20
OU_CC: 20/20

## 2024-03-15 ENCOUNTER — CONTACT LENSES/GLASSES VISIT (OUTPATIENT)
Dept: URBAN - METROPOLITAN AREA CLINIC 31 | Facility: CLINIC | Age: 44
End: 2024-03-15

## 2024-03-15 DIAGNOSIS — H52.13: ICD-10-CM

## 2024-03-15 PROCEDURE — 92015GRNC REFRACTION GLASSES RECHECK - NO CHARGE

## 2024-03-15 ASSESSMENT — VISUAL ACUITY
OD_CC: 20/20
OS_CC: 20/20
OU_CC: 20/20

## 2025-02-03 ENCOUNTER — COMPREHENSIVE EXAM (OUTPATIENT)
Age: 45
End: 2025-02-03

## 2025-02-03 DIAGNOSIS — H52.223: ICD-10-CM

## 2025-02-03 DIAGNOSIS — H52.13: ICD-10-CM

## 2025-02-03 PROCEDURE — 92014 COMPRE OPH EXAM EST PT 1/>: CPT

## 2025-02-03 PROCEDURE — 92015 DETERMINE REFRACTIVE STATE: CPT
